# Patient Record
(demographics unavailable — no encounter records)

---

## 2024-10-23 NOTE — PHYSICAL EXAM
[FreeTextEntry3] : Exam of external genitalia was deferred.  -On the trunk and upper/lower extremities bilaterally, are multiple scattered tan to brown macules and papules with regular borders. Some of these were examined dermoscopically and had reassuring features. Many of these were examined with dermoscopy and had benign features. -Scattered on the trunk and extremities are several cherry red papules. -R cheek: 3x4mm brown symmetrical smooth papule -Involving the scalp are ill-defined pink patches with flaky scale.

## 2024-10-23 NOTE — HISTORY OF PRESENT ILLNESS
[FreeTextEntry1] : NPV- FBSE [de-identified] : TAYLOR LIZYOLIS AREVALO 29yo F new pt  -chronic itchy scalp, sometimes flaky. Tried OTC dandruff shampoo, does not feel it helps.  -mentions brown mole on right cheek. had for years. recently became inflamed, thinks there was a pimple under it. This is now resolved and no longer symptomatic.   SH: gen surg resident at [facility]  PMH -no history of skin cancer  FHx  -No family history of melanoma -maternal gma - scc

## 2024-10-23 NOTE — ASSESSMENT
[FreeTextEntry1] : # Skin cancer screening  Sun protection reviewed. The patient was educated regarding appropriate sun protection measures, including wearing sunscreen with SPF 30 or higher when outdoors, sun protective clothing, and sun avoidance.    #Cherry angioma #Benign appearing nevi Patient was reassured of the benign nature of these findings. No further treatment needed at this time. If any lesion changes or becomes symptomatic I recommend follow-up  #Chronic scalp pruritus ddx: favor seb derm vs. eczematous dermatitis Was not able to tolerate KCN shampoo in the past due to scalp irritation -try vanicream dandruff shampoo (OTC) -start clobetasol .05% foam bid for two weeks on and two weeks off as needed for itching. If not covered, will send foam or zoryve. She was instructed to call/message   RTC yearly or sooner prn